# Patient Record
Sex: FEMALE | Race: BLACK OR AFRICAN AMERICAN | NOT HISPANIC OR LATINO | URBAN - METROPOLITAN AREA
[De-identification: names, ages, dates, MRNs, and addresses within clinical notes are randomized per-mention and may not be internally consistent; named-entity substitution may affect disease eponyms.]

---

## 2020-07-06 ENCOUNTER — OFFICE VISIT (OUTPATIENT)
Dept: URBAN - METROPOLITAN AREA TELEHEALTH 2 | Facility: TELEHEALTH | Age: 80
End: 2020-07-06

## 2020-07-07 ENCOUNTER — OFFICE VISIT (OUTPATIENT)
Dept: URBAN - METROPOLITAN AREA CLINIC 17 | Facility: CLINIC | Age: 80
End: 2020-07-07

## 2020-08-13 ENCOUNTER — OFFICE VISIT (OUTPATIENT)
Dept: URBAN - METROPOLITAN AREA CLINIC 17 | Facility: CLINIC | Age: 80
End: 2020-08-13
Payer: MEDICARE

## 2020-08-13 DIAGNOSIS — K21.0 GERD WITH ESOPHAGITIS: ICD-10-CM

## 2020-08-13 DIAGNOSIS — R13.10 ESOPHAGEAL DYSPHAGIA: ICD-10-CM

## 2020-08-13 PROCEDURE — 99214 OFFICE O/P EST MOD 30 MIN: CPT | Performed by: INTERNAL MEDICINE

## 2020-08-13 RX ORDER — TRAMADOL HYDROCHLORIDE 50 MG/1
1 TABLET AS NEEDED TABLET, FILM COATED ORAL ONCE A DAY
Status: ACTIVE | COMMUNITY

## 2020-08-13 RX ORDER — METOPROLOL SUCCINATE 25 MG/1
TABLET, EXTENDED RELEASE ORAL
Qty: 0 | Refills: 0 | Status: ACTIVE | COMMUNITY
Start: 2017-07-11

## 2020-08-13 RX ORDER — LEVOTHYROXINE SODIUM 0.05 MG/1
TABLET ORAL
Qty: 0 | Refills: 0 | Status: ACTIVE | COMMUNITY
Start: 2017-05-31

## 2020-08-13 RX ORDER — OMEPRAZOLE 40 MG/1
1 CAPSULE 30 MINUTES BEFORE MORNING MEAL CAPSULE, DELAYED RELEASE ORAL ONCE A DAY
Qty: 90 | Refills: 3 | OUTPATIENT
Start: 2020-08-13

## 2020-08-13 NOTE — HPI-OTHER HISTORIES
This is a follow-up appointment for this patient, a 79 year old  female after a previous visit on 07/22/2019, for an evaluation for dysphagia and heartburn. The patient has been experiencing these symptoms for 10 years. The heartburn lasts less than an hour. It is exacerbated by peanuts. It is improved by liquid Anti-acids and. The patient has used medications to relieve the heartburn. Medications used include Omeprazole 40 mg with moderate relief which she ran out of  a few weeks ago. Dysphagia is noted with solid food which initially had improved following EGD with dilation August 2019. Biopsies revealed esophagitis.

## 2020-08-17 ENCOUNTER — TELEPHONE ENCOUNTER (OUTPATIENT)
Dept: URBAN - METROPOLITAN AREA CLINIC 17 | Facility: CLINIC | Age: 80
End: 2020-08-17

## 2020-09-24 ENCOUNTER — OFFICE VISIT (OUTPATIENT)
Dept: URBAN - METROPOLITAN AREA CLINIC 17 | Facility: CLINIC | Age: 80
End: 2020-09-24

## 2020-10-19 ENCOUNTER — OFFICE VISIT (OUTPATIENT)
Dept: URBAN - METROPOLITAN AREA TELEHEALTH 2 | Facility: TELEHEALTH | Age: 80
End: 2020-10-19
Payer: MEDICARE

## 2020-10-19 DIAGNOSIS — K21.00 CHRONIC REFLUX ESOPHAGITIS: ICD-10-CM

## 2020-10-19 DIAGNOSIS — R13.19 OTHER DYSPHAGIA: ICD-10-CM

## 2020-10-19 PROCEDURE — 99214 OFFICE O/P EST MOD 30 MIN: CPT | Performed by: INTERNAL MEDICINE

## 2020-10-19 RX ORDER — METOPROLOL SUCCINATE 25 MG/1
TABLET, EXTENDED RELEASE ORAL
Qty: 0 | Refills: 0 | Status: ACTIVE | COMMUNITY
Start: 2017-07-11

## 2020-10-19 RX ORDER — OMEPRAZOLE 40 MG/1
1 CAPSULE 30 MINUTES BEFORE MORNING MEAL CAPSULE, DELAYED RELEASE ORAL ONCE A DAY
OUTPATIENT
Start: 2020-08-13

## 2020-10-19 RX ORDER — OMEPRAZOLE 40 MG/1
1 CAPSULE 30 MINUTES BEFORE MORNING MEAL CAPSULE, DELAYED RELEASE ORAL ONCE A DAY
Qty: 90 | Refills: 3 | Status: ACTIVE | COMMUNITY
Start: 2020-08-13

## 2020-10-19 RX ORDER — FAMOTIDINE 40 MG/1
1 TABLET AT BEDTIME TABLET, FILM COATED ORAL ONCE A DAY
Qty: 30 TABLET | Refills: 6 | OUTPATIENT
Start: 2020-10-19

## 2020-10-19 RX ORDER — TRAMADOL HYDROCHLORIDE 50 MG/1
1 TABLET AS NEEDED TABLET, FILM COATED ORAL ONCE A DAY
Status: ACTIVE | COMMUNITY

## 2020-10-19 RX ORDER — LEVOTHYROXINE SODIUM 0.05 MG/1
TABLET ORAL
Qty: 0 | Refills: 0 | Status: ACTIVE | COMMUNITY
Start: 2017-05-31

## 2020-10-19 NOTE — HPI-TODAY'S VISIT:
Ms. Gomez is a 80-year old female here for follow-up of a barium swallow performed August 2020 for dysphagia.  The examination revealed revealed esophageal dysmotility but was negative for an esophageal stricture, hiatal hernia, and acid reflux.  The patient admits to intermittent dysphagia.  An upper endoscopy 2019 with biopsy was unremarkable.  She takes omeprazole 40 mg a few days a week which helps her symptoms.  She stopped taking it daily due to the fear of dementia as a potential side effect.

## 2020-10-19 NOTE — PHYSICAL EXAM CONSTITUTIONAL:
in no acute distress, well developed, well nourished, ambulating without difficulty, normal communication ability , pleasant, well nourished, well developed, in no acute distress , normal communication ability

## 2021-01-28 ENCOUNTER — OFFICE VISIT (OUTPATIENT)
Dept: URBAN - METROPOLITAN AREA CLINIC 17 | Facility: CLINIC | Age: 81
End: 2021-01-28

## 2021-02-23 ENCOUNTER — OFFICE VISIT (OUTPATIENT)
Dept: URBAN - METROPOLITAN AREA CLINIC 17 | Facility: CLINIC | Age: 81
End: 2021-02-23

## 2021-02-23 RX ORDER — TRAMADOL HYDROCHLORIDE 50 MG/1
1 TABLET AS NEEDED TABLET, FILM COATED ORAL ONCE A DAY
Status: ACTIVE | COMMUNITY

## 2021-02-23 RX ORDER — LEVOTHYROXINE SODIUM 0.05 MG/1
TABLET ORAL
Qty: 0 | Refills: 0 | Status: ACTIVE | COMMUNITY
Start: 2017-05-31

## 2021-02-23 RX ORDER — METOPROLOL SUCCINATE 25 MG/1
TABLET, EXTENDED RELEASE ORAL
Qty: 0 | Refills: 0 | Status: ACTIVE | COMMUNITY
Start: 2017-07-11

## 2021-02-23 RX ORDER — FAMOTIDINE 40 MG/1
1 TABLET AT BEDTIME TABLET, FILM COATED ORAL ONCE A DAY
Qty: 30 TABLET | Refills: 6 | Status: ACTIVE | COMMUNITY
Start: 2020-10-19

## 2021-02-25 ENCOUNTER — OFFICE VISIT (OUTPATIENT)
Dept: URBAN - METROPOLITAN AREA CLINIC 17 | Facility: CLINIC | Age: 81
End: 2021-02-25

## 2021-04-08 ENCOUNTER — OFFICE VISIT (OUTPATIENT)
Dept: URBAN - METROPOLITAN AREA CLINIC 17 | Facility: CLINIC | Age: 81
End: 2021-04-08
Payer: MEDICARE

## 2021-04-08 ENCOUNTER — WEB ENCOUNTER (OUTPATIENT)
Dept: URBAN - METROPOLITAN AREA CLINIC 17 | Facility: CLINIC | Age: 81
End: 2021-04-08

## 2021-04-08 DIAGNOSIS — R63.4 WEIGHT LOSS: ICD-10-CM

## 2021-04-08 DIAGNOSIS — K21.9 GERD WITHOUT ESOPHAGITIS: ICD-10-CM

## 2021-04-08 PROCEDURE — 74177 CT ABD & PELVIS W/CONTRAST: CPT | Performed by: INTERNAL MEDICINE

## 2021-04-08 PROCEDURE — 99214 OFFICE O/P EST MOD 30 MIN: CPT | Performed by: INTERNAL MEDICINE

## 2021-04-08 RX ORDER — METOPROLOL SUCCINATE 25 MG/1
TABLET, EXTENDED RELEASE ORAL
Qty: 0 | Refills: 0 | Status: ACTIVE | COMMUNITY
Start: 2017-07-11

## 2021-04-08 RX ORDER — LEVOTHYROXINE SODIUM 0.05 MG/1
TABLET ORAL
Qty: 0 | Refills: 0 | Status: ACTIVE | COMMUNITY
Start: 2017-05-31

## 2021-04-08 RX ORDER — FAMOTIDINE 40 MG/1
1 TABLET AT BEDTIME TABLET, FILM COATED ORAL ONCE A DAY
Qty: 30 TABLET | Refills: 6 | Status: ACTIVE | COMMUNITY
Start: 2020-10-19

## 2021-04-08 RX ORDER — TRAMADOL HYDROCHLORIDE 50 MG/1
1 TABLET AS NEEDED TABLET, FILM COATED ORAL ONCE A DAY
Status: ACTIVE | COMMUNITY

## 2021-04-11 ENCOUNTER — TELEPHONE ENCOUNTER (OUTPATIENT)
Dept: URBAN - METROPOLITAN AREA CLINIC 92 | Facility: CLINIC | Age: 81
End: 2021-04-11

## 2021-04-11 LAB
A/G RATIO: 1.5
ALBUMIN: 4.1
ALKALINE PHOSPHATASE: 82
ALT (SGPT): 10
AST (SGOT): 20
BILIRUBIN, TOTAL: 0.3
BUN/CREATININE RATIO: 20
BUN: 17
CA 19-9: <2
CALCIUM: 9.1
CARBON DIOXIDE, TOTAL: 24
CHLORIDE: 104
CREATININE: 0.86
EGFR IF AFRICN AM: 74
EGFR IF NONAFRICN AM: 64
GLOBULIN, TOTAL: 2.8
GLUCOSE: 82
HEMATOCRIT: 39.5
HEMOGLOBIN: 12.8
MCH: 29.1
MCHC: 32.4
MCV: 90
NRBC: (no result)
PLATELETS: 263
POTASSIUM: 5
PROTEIN, TOTAL: 6.9
RBC: 4.4
RDW: 13.2
SODIUM: 141
WBC: 6.7

## 2021-04-20 ENCOUNTER — TELEPHONE ENCOUNTER (OUTPATIENT)
Dept: URBAN - METROPOLITAN AREA CLINIC 23 | Facility: CLINIC | Age: 81
End: 2021-04-20

## 2021-04-21 ENCOUNTER — TELEPHONE ENCOUNTER (OUTPATIENT)
Dept: URBAN - METROPOLITAN AREA CLINIC 98 | Facility: CLINIC | Age: 81
End: 2021-04-21

## 2021-05-03 ENCOUNTER — TELEPHONE ENCOUNTER (OUTPATIENT)
Dept: URBAN - METROPOLITAN AREA CLINIC 92 | Facility: CLINIC | Age: 81
End: 2021-05-03

## 2021-05-03 ENCOUNTER — OFFICE VISIT (OUTPATIENT)
Dept: URBAN - METROPOLITAN AREA TELEHEALTH 2 | Facility: TELEHEALTH | Age: 81
End: 2021-05-03
Payer: MEDICARE

## 2021-05-03 DIAGNOSIS — K59.01 CONSTIPATION BY DELAYED COLONIC TRANSIT: ICD-10-CM

## 2021-05-03 DIAGNOSIS — K11.7 XEROSTOMIA: ICD-10-CM

## 2021-05-03 DIAGNOSIS — K21.9 GERD WITHOUT ESOPHAGITIS: ICD-10-CM

## 2021-05-03 PROCEDURE — 99213 OFFICE O/P EST LOW 20 MIN: CPT | Performed by: INTERNAL MEDICINE

## 2021-05-03 RX ORDER — TRAMADOL HYDROCHLORIDE 50 MG/1
1 TABLET AS NEEDED TABLET, FILM COATED ORAL ONCE A DAY
Status: ACTIVE | COMMUNITY

## 2021-05-03 RX ORDER — METOPROLOL SUCCINATE 25 MG/1
TABLET, EXTENDED RELEASE ORAL
Qty: 0 | Refills: 0 | Status: ACTIVE | COMMUNITY
Start: 2017-07-11

## 2021-05-03 RX ORDER — LINACLOTIDE 145 UG/1
1 CAPSULE AT LEAST 30 MINUTES BEFORE THE FIRST MEAL CAPSULE, GELATIN COATED ORAL ONCE A DAY
Qty: 30 | Refills: 2 | OUTPATIENT
Start: 2021-05-03 | End: 2021-08-01

## 2021-05-03 RX ORDER — OMEPRAZOLE 40 MG/1
1 CAPSULE 30 MINUTES BEFORE MORNING MEAL CAPSULE, DELAYED RELEASE ORAL ONCE A DAY
Qty: 30 | Refills: 6 | OUTPATIENT
Start: 2021-05-03

## 2021-05-03 RX ORDER — LEVOTHYROXINE SODIUM 0.05 MG/1
TABLET ORAL
Qty: 0 | Refills: 0 | Status: ACTIVE | COMMUNITY
Start: 2017-05-31

## 2021-05-03 RX ORDER — FAMOTIDINE 40 MG/1
1 TABLET AT BEDTIME TABLET, FILM COATED ORAL ONCE A DAY
Qty: 30 TABLET | Refills: 6 | Status: ACTIVE | COMMUNITY
Start: 2020-10-19

## 2021-05-03 NOTE — HPI-TODAY'S VISIT:
October 2020 Ms. Gomez is a 80-year old female here for follow-up of a barium swallow performed August 2020 for dysphagia.  The examination revealed revealed esophageal dysmotility but was negative for an esophageal stricture, hiatal hernia, and acid reflux.  The patient admits to intermittent dysphagia.  An upper endoscopy 2019 with biopsy was unremarkable.  She takes omeprazole 40 mg a few days a week which helps her symptoms.  She stopped taking it daily due to the fear of dementia as a potential side effect.  April 8, 2021 The patient reports having a sick feeling in the stomach most mornings.  She takes Famotidine each night.  She admits to nausea but denies vomiting.  She has lost 6 pounds since October 2020.  She reports stress related to taking care of her ill  for the past 5 years.   Today:  May 3, 2021 The patient is here for follow up of abdominal discomfort.  A CT of the abd as well as CBC, CMP, and CA 19-9 were  unremarkable. She complains of early morming queasiness which dissipates after she eats breakfast.  Famotidine 40 ng at night has not provided symptomatic relief.   She admits to stress assoicated with taking care of her impaled .  Angi lawson is also complaining of dry mouth.  She has been on gabapentin for years.  The dose was increased recently to TID.  Constipation has been an issue for years. She has not responded to Miralax.

## 2021-06-28 ENCOUNTER — OFFICE VISIT (OUTPATIENT)
Dept: URBAN - METROPOLITAN AREA TELEHEALTH 2 | Facility: TELEHEALTH | Age: 81
End: 2021-06-28
Payer: MEDICARE

## 2021-06-28 DIAGNOSIS — K59.01 CONSTIPATION BY DELAYED COLONIC TRANSIT: ICD-10-CM

## 2021-06-28 PROCEDURE — 99214 OFFICE O/P EST MOD 30 MIN: CPT | Performed by: INTERNAL MEDICINE

## 2021-06-28 RX ORDER — TRAMADOL HYDROCHLORIDE 50 MG/1
1 TABLET AS NEEDED TABLET, FILM COATED ORAL ONCE A DAY
Status: ACTIVE | COMMUNITY

## 2021-06-28 RX ORDER — LINACLOTIDE 145 UG/1
1 CAPSULE AT LEAST 30 MINUTES BEFORE THE FIRST MEAL CAPSULE, GELATIN COATED ORAL ONCE A DAY
OUTPATIENT
Start: 2021-05-03 | End: 2021-08-01

## 2021-06-28 RX ORDER — OMEPRAZOLE 40 MG/1
1 CAPSULE 30 MINUTES BEFORE MORNING MEAL CAPSULE, DELAYED RELEASE ORAL ONCE A DAY
Qty: 30 | Refills: 6 | Status: ACTIVE | COMMUNITY
Start: 2021-05-03

## 2021-06-28 RX ORDER — METOPROLOL SUCCINATE 25 MG/1
TABLET, EXTENDED RELEASE ORAL
Qty: 0 | Refills: 0 | Status: ACTIVE | COMMUNITY
Start: 2017-07-11

## 2021-06-28 RX ORDER — LINACLOTIDE 290 UG/1
1 CAPSULE AT LEAST 30 MINUTES BEFORE THE FIRST MEAL OF THE DAY ON AN EMPTY STOMACH CAPSULE, GELATIN COATED ORAL ONCE A DAY
Qty: 90 | Refills: 0 | OUTPATIENT
Start: 2021-06-28 | End: 2021-09-26

## 2021-06-28 RX ORDER — LINACLOTIDE 145 UG/1
1 CAPSULE AT LEAST 30 MINUTES BEFORE THE FIRST MEAL CAPSULE, GELATIN COATED ORAL ONCE A DAY
Qty: 30 | Refills: 2 | Status: ACTIVE | COMMUNITY
Start: 2021-05-03 | End: 2021-08-01

## 2021-06-28 RX ORDER — LEVOTHYROXINE SODIUM 0.05 MG/1
TABLET ORAL
Qty: 0 | Refills: 0 | Status: ACTIVE | COMMUNITY
Start: 2017-05-31

## 2021-06-28 RX ORDER — FAMOTIDINE 40 MG/1
1 TABLET AT BEDTIME TABLET, FILM COATED ORAL ONCE A DAY
Qty: 30 TABLET | Refills: 6 | Status: ACTIVE | COMMUNITY
Start: 2020-10-19

## 2021-06-28 NOTE — PHYSICAL EXAM SKIN:
no rashes , no suspicious lesions , no areas of discoloration , no jaundice present , good turgor , no masses , no tenderness on palpation , no rashes , no suspicious lesions , no areas of discoloration

## 2021-06-28 NOTE — HPI-TODAY'S VISIT:
Ms. Gomez is here  for follow-up of constipation.  Linzess 145 mcg once daily was called in.  She had minimal improvement of stool evacuation.  Bowel movements are now twice a week.  She continues to have abdominal bloating.

## 2021-06-28 NOTE — PHYSICAL EXAM NEUROLOGIC:
oriented to person, place and time , normal sensation , short and long term memory intact , oriented to person, place and time no

## 2021-06-28 NOTE — PHYSICAL EXAM GASTROINTESTINAL
Abdomen , soft, nontender, nondistended , no guarding or rigidity , no masses palpable , normal bowel sounds , Liver and Spleen , no hepatomegaly present , no hepatosplenomegaly , liver nontender , spleen not palpable , Rectal , normal sphincter tone , no internal hemorrhoids, rectal masses or bleeding present , Self Exam: Abdomen soft, non-tender to palpatation, non-distended

## 2021-06-28 NOTE — PHYSICAL EXAM CHEST:
no lesions,  no deformities,  no traumatic injuries,  no significant scars are present,  chest wall non-tender,  no masses present, breathing is unlabored without accessory muscle use, normal breath sounds , breathing is unlabored without accessory muscle use.

## 2021-08-23 ENCOUNTER — TELEPHONE ENCOUNTER (OUTPATIENT)
Dept: URBAN - METROPOLITAN AREA TELEHEALTH 2 | Facility: TELEHEALTH | Age: 81
End: 2021-08-23

## 2021-08-23 ENCOUNTER — OFFICE VISIT (OUTPATIENT)
Dept: URBAN - METROPOLITAN AREA TELEHEALTH 2 | Facility: TELEHEALTH | Age: 81
End: 2021-08-23
Payer: MEDICARE

## 2021-08-23 DIAGNOSIS — K21.9 GERD WITHOUT ESOPHAGITIS: ICD-10-CM

## 2021-08-23 DIAGNOSIS — K59.01 CONSTIPATION BY DELAYED COLONIC TRANSIT: ICD-10-CM

## 2021-08-23 DIAGNOSIS — R13.19 ESOPHAGEAL DYSPHAGIA: ICD-10-CM

## 2021-08-23 PROCEDURE — 99213 OFFICE O/P EST LOW 20 MIN: CPT | Performed by: INTERNAL MEDICINE

## 2021-08-23 RX ORDER — OMEPRAZOLE 40 MG/1
1 CAPSULE 30 MINUTES BEFORE MORNING MEAL CAPSULE, DELAYED RELEASE ORAL ONCE A DAY
Qty: 30 | Refills: 6 | Status: ACTIVE | COMMUNITY
Start: 2021-05-03

## 2021-08-23 RX ORDER — LINACLOTIDE 290 UG/1
1 CAPSULE AT LEAST 30 MINUTES BEFORE THE FIRST MEAL OF THE DAY ON AN EMPTY STOMACH CAPSULE, GELATIN COATED ORAL ONCE A DAY
Qty: 90 | Refills: 0 | Status: ACTIVE | COMMUNITY
Start: 2021-06-28 | End: 2021-09-26

## 2021-08-23 RX ORDER — METOPROLOL SUCCINATE 25 MG/1
TABLET, EXTENDED RELEASE ORAL
Qty: 0 | Refills: 0 | Status: ACTIVE | COMMUNITY
Start: 2017-07-11

## 2021-08-23 RX ORDER — LINACLOTIDE 290 UG/1
1 CAPSULE AT LEAST 30 MINUTES BEFORE THE FIRST MEAL OF THE DAY ON AN EMPTY STOMACH CAPSULE, GELATIN COATED ORAL ONCE A DAY
OUTPATIENT
Start: 2021-06-28 | End: 2021-09-26

## 2021-08-23 RX ORDER — FAMOTIDINE 40 MG/1
1 TABLET AT BEDTIME TABLET, FILM COATED ORAL ONCE A DAY
Qty: 30 TABLET | Refills: 6 | Status: ACTIVE | COMMUNITY
Start: 2020-10-19

## 2021-08-23 RX ORDER — LINACLOTIDE 145 UG/1
1 CAPSULE AT LEAST 30 MINUTES BEFORE THE FIRST MEAL CAPSULE, GELATIN COATED ORAL ONCE A DAY
Qty: 30 | Refills: 2 | COMMUNITY
Start: 2021-05-03 | End: 2021-08-23

## 2021-08-23 RX ORDER — LEVOTHYROXINE SODIUM 0.05 MG/1
TABLET ORAL
Qty: 0 | Refills: 0 | Status: ACTIVE | COMMUNITY
Start: 2017-05-31

## 2021-08-23 RX ORDER — LINACLOTIDE 145 UG/1
1 CAPSULE AT LEAST 30 MINUTES BEFORE THE FIRST MEAL CAPSULE, GELATIN COATED ORAL ONCE A DAY
Qty: 30 | Refills: 2 | OUTPATIENT
Start: 2021-05-03 | End: 2021-08-23

## 2021-08-23 RX ORDER — FAMOTIDINE 40 MG/1
1 TABLET AT BEDTIME TABLET, FILM COATED ORAL ONCE A DAY
Qty: 30 TABLET | Refills: 6
Start: 2020-10-19

## 2021-08-23 RX ORDER — TRAMADOL HYDROCHLORIDE 50 MG/1
1 TABLET AS NEEDED TABLET, FILM COATED ORAL ONCE A DAY
Status: ACTIVE | COMMUNITY

## 2021-08-23 NOTE — HPI-TODAY'S VISIT:
(June 2021) Ms. Gomez is here  for follow-up of constipation.  Linzess 145 mcg once daily was called in.  She had minimal improvement of stool evacuation.  Bowel movements are now twice a week.  She continues to have abdominal bloating.   Today: August 23, 2021 The patient stopped the Linzess 290 due to abdominal discomfort.  Prune juice twice a day results in bowel movements every 2 - 3 days.  She does not have the sensation of complete emptying.   She also reports dysphagia to pills and sometimes solid food.  She denies early satiety and weight loss.  Omeprazole 40 mg is taken each morning.  She needs a refill on Famotidine taken at night.

## 2021-09-20 ENCOUNTER — OFFICE VISIT (OUTPATIENT)
Dept: URBAN - METROPOLITAN AREA TELEHEALTH 2 | Facility: TELEHEALTH | Age: 81
End: 2021-09-20
Payer: MEDICARE

## 2021-09-20 DIAGNOSIS — K59.09 CHANGE IN BOWEL MOVEMENTS INTERMITTENT CONSTIPATION. URGENCY IN THE MORNING.: ICD-10-CM

## 2021-09-20 DIAGNOSIS — K21.9 GERD WITHOUT ESOPHAGITIS: ICD-10-CM

## 2021-09-20 PROCEDURE — 99213 OFFICE O/P EST LOW 20 MIN: CPT | Performed by: INTERNAL MEDICINE

## 2021-09-20 RX ORDER — OMEPRAZOLE 40 MG/1
1 CAPSULE 30 MINUTES BEFORE MORNING MEAL CAPSULE, DELAYED RELEASE ORAL ONCE A DAY
Qty: 30 | Refills: 6 | Status: ACTIVE | COMMUNITY
Start: 2021-05-03

## 2021-09-20 RX ORDER — METOPROLOL SUCCINATE 25 MG/1
TABLET, EXTENDED RELEASE ORAL
Qty: 0 | Refills: 0 | Status: ACTIVE | COMMUNITY
Start: 2017-07-11

## 2021-09-20 RX ORDER — LEVOTHYROXINE SODIUM 0.05 MG/1
TABLET ORAL
Qty: 0 | Refills: 0 | Status: ACTIVE | COMMUNITY
Start: 2017-05-31

## 2021-09-20 RX ORDER — TRAMADOL HYDROCHLORIDE 50 MG/1
1 TABLET AS NEEDED TABLET, FILM COATED ORAL ONCE A DAY
Status: ACTIVE | COMMUNITY

## 2021-09-20 RX ORDER — FAMOTIDINE 40 MG/1
1 TABLET AT BEDTIME TABLET, FILM COATED ORAL ONCE A DAY
Qty: 30 TABLET | Refills: 6 | Status: ACTIVE | COMMUNITY
Start: 2020-10-19

## 2021-09-20 RX ORDER — OMEPRAZOLE 40 MG/1
1 CAPSULE 30 MINUTES BEFORE MORNING MEAL CAPSULE, DELAYED RELEASE ORAL ONCE A DAY
OUTPATIENT
Start: 2021-05-03

## 2021-09-20 NOTE — HPI-TODAY'S VISIT:
(June 2021) Ms. Gomez is here  for follow-up of constipation.  Linzess 145 mcg once daily was called in.  She had minimal improvement of stool evacuation.  Bowel movements are now twice a week.  She continues to have abdominal bloating.  (August 23, 2021) The patient stopped the Linzess 290 due to abdominal discomfort.  Prune juice twice a day results in bowel movements every 2 - 3 days.  She does not have the sensation of complete emptying.   She also reports dysphagia to pills and sometimes solid food.  She denies early satiety and weight loss.  Omeprazole 40 mg is taken each morning.  She needs a refill on Famotidine taken at night.  Today: September 20, 2021 The patient is here for follow up of a barium swallow performed for esophageal dysphagia.  The exam 9/1/2021 demonstrated mild esophageal dysmotility with minimal delayed clearance of barium from the distal esophagus.  There was no evidence of an esophageal stricture or GERD.  The patient is currently taking Omeprazole 40 mg once a day and famotidine 40 mg at night.  Constipation has improved with Miralax/prune juice combo daily.   A CT scan April 2021 was negative for an acute abnormality.

## 2022-01-21 ENCOUNTER — OFFICE VISIT (OUTPATIENT)
Dept: URBAN - METROPOLITAN AREA CLINIC 92 | Facility: CLINIC | Age: 82
End: 2022-01-21
Payer: MEDICARE

## 2022-01-21 ENCOUNTER — WEB ENCOUNTER (OUTPATIENT)
Dept: URBAN - METROPOLITAN AREA CLINIC 92 | Facility: CLINIC | Age: 82
End: 2022-01-21

## 2022-01-21 VITALS
BODY MASS INDEX: 28.7 KG/M2 | DIASTOLIC BLOOD PRESSURE: 88 MMHG | SYSTOLIC BLOOD PRESSURE: 145 MMHG | HEART RATE: 82 BPM | WEIGHT: 162 LBS | HEIGHT: 63 IN

## 2022-01-21 DIAGNOSIS — K21.9 GASTROESOPHAGEAL REFLUX DISEASE, UNSPECIFIED WHETHER ESOPHAGITIS PRESENT: ICD-10-CM

## 2022-01-21 PROBLEM — 235595009: Status: ACTIVE | Noted: 2022-01-21

## 2022-01-21 PROCEDURE — 99214 OFFICE O/P EST MOD 30 MIN: CPT | Performed by: INTERNAL MEDICINE

## 2022-01-21 RX ORDER — METOPROLOL SUCCINATE 25 MG/1
TABLET, EXTENDED RELEASE ORAL
Qty: 0 | Refills: 0 | Status: ACTIVE | COMMUNITY
Start: 2017-07-11

## 2022-01-21 RX ORDER — FAMOTIDINE 40 MG/1
1 TABLET AT BEDTIME TABLET, FILM COATED ORAL ONCE A DAY
Qty: 30 TABLET | Refills: 6 | Status: ON HOLD | COMMUNITY
Start: 2020-10-19

## 2022-01-21 RX ORDER — LEVOTHYROXINE SODIUM 0.05 MG/1
TABLET ORAL
Qty: 0 | Refills: 0 | Status: ACTIVE | COMMUNITY
Start: 2017-05-31

## 2022-01-21 RX ORDER — TRAMADOL HYDROCHLORIDE 50 MG/1
1 TABLET AS NEEDED TABLET, FILM COATED ORAL ONCE A DAY
Status: ACTIVE | COMMUNITY

## 2022-01-21 RX ORDER — OMEPRAZOLE 40 MG/1
1 CAPSULE 30 MINUTES BEFORE MORNING MEAL CAPSULE, DELAYED RELEASE ORAL ONCE A DAY
Qty: 90 | Refills: 3
Start: 2021-05-03

## 2022-01-21 NOTE — HPI-TODAY'S VISIT:
81-year-old female hypothyroidism, GERD, who presents to discuss nausea and GERD.  For the past month, she has felt nauseated in the morning and notes the sensation of acid in her throat.  Feels like she can cough it up in the morning and also notes a sore throat at that time.  Occasionally notes the sensation of food sitting in her esophagus, localizes to the SSN.  Last EGD 8/2019 for dysphagia, performed empiric dilation to 50 Sami.  She has been on omeprazole in the past but believes that she stopped it about a month ago.  Notes that when she was taking it, it seemed to control the symptoms above.  She was worried that it caused dementia and so she stopped it. Barium swallow from September 2021 reviewed, notable for mild dysmotility.

## 2022-02-18 ENCOUNTER — TELEPHONE ENCOUNTER (OUTPATIENT)
Dept: URBAN - METROPOLITAN AREA CLINIC 92 | Facility: CLINIC | Age: 82
End: 2022-02-18

## 2022-02-18 ENCOUNTER — OFFICE VISIT (OUTPATIENT)
Dept: URBAN - METROPOLITAN AREA CLINIC 92 | Facility: CLINIC | Age: 82
End: 2022-02-18
Payer: MEDICARE

## 2022-02-18 VITALS
WEIGHT: 160 LBS | BODY MASS INDEX: 28.35 KG/M2 | HEIGHT: 63 IN | SYSTOLIC BLOOD PRESSURE: 129 MMHG | DIASTOLIC BLOOD PRESSURE: 73 MMHG | TEMPERATURE: 96.8 F | HEART RATE: 74 BPM

## 2022-02-18 DIAGNOSIS — K21.9 GERD: ICD-10-CM

## 2022-02-18 PROCEDURE — 99214 OFFICE O/P EST MOD 30 MIN: CPT | Performed by: INTERNAL MEDICINE

## 2022-02-18 RX ORDER — TRAMADOL HYDROCHLORIDE 50 MG/1
1 TABLET AS NEEDED TABLET, FILM COATED ORAL ONCE A DAY
Status: ACTIVE | COMMUNITY

## 2022-02-18 RX ORDER — METOPROLOL SUCCINATE 25 MG/1
TABLET, EXTENDED RELEASE ORAL
Qty: 0 | Refills: 0 | Status: ACTIVE | COMMUNITY
Start: 2017-07-11

## 2022-02-18 RX ORDER — FAMOTIDINE 40 MG/1
1 TABLET AT BEDTIME TABLET, FILM COATED ORAL ONCE A DAY
Qty: 30 TABLET | Refills: 6 | Status: ON HOLD | COMMUNITY
Start: 2020-10-19

## 2022-02-18 RX ORDER — OMEPRAZOLE 40 MG/1
1 CAPSULE 30 MINUTES BEFORE MORNING AND EVENING MEAL CAPSULE, DELAYED RELEASE ORAL TWICE A DAY
Qty: 180 | Refills: 1
Start: 2021-05-03

## 2022-02-18 RX ORDER — OMEPRAZOLE 40 MG/1
1 CAPSULE 30 MINUTES BEFORE MORNING MEAL CAPSULE, DELAYED RELEASE ORAL ONCE A DAY
Qty: 90 | Refills: 3 | Status: ACTIVE | COMMUNITY
Start: 2021-05-03

## 2022-02-18 RX ORDER — LEVOTHYROXINE SODIUM 0.05 MG/1
TABLET ORAL
Qty: 0 | Refills: 0 | Status: ACTIVE | COMMUNITY
Start: 2017-05-31

## 2022-02-18 NOTE — EXAM-FUNCTIONAL ASSESSMENT
CONSTITUTIONAL - well-developed, well-nourished, NAD HEENT - sclerae and conjuntivae appear normal, external nose normal appearance, no nasal discharge NECK - normal appearance, no deformities CHEST - no increased work of breathing, no accessory muscle use CV - no edema, irregular rate GI - soft, NTND, no guarding or rigidity, no palpable masses or HSM MSK - no deformities, normal gait SKIN - good turgor, no obvious rashes NEURO - AAOx3 PSYCH - normal mood and appropriate affect

## 2022-02-18 NOTE — HPI-TODAY'S VISIT:
81-year-old female hypothyroidism, GERD, who presents to discuss nausea and GERD.  For the past month, she has felt nauseated in the morning and notes the sensation of acid in her throat.  Feels like she can cough it up in the morning and also notes a sore throat at that time.  Occasionally notes the sensation of food sitting in her esophagus, localizes to the SSN.  Last EGD 8/2019 for dysphagia, performed empiric dilation to 50 Divehi.  She has been on omeprazole in the past but believes that she stopped it about a month ago.  Notes that when she was taking it, it seemed to control the symptoms above.  She was worried that it caused dementia and so she stopped it. Barium swallow from September 2021 reviewed, notable for mild dysmotility.  ***2/18/22: Since I last saw her, she started omeprazole 40 mg daily, which provided some relief.  However she is still having regurgitation and dysphagia, localizing to the neck and substernal notch.  Tastes bitter and sour when it comes back up.  She recently was found to have an irregular heartbeat, and is set to undergo a stress test next week and then follow-up again with cardiology.

## 2022-03-03 ENCOUNTER — LAB OUTSIDE AN ENCOUNTER (OUTPATIENT)
Dept: URBAN - METROPOLITAN AREA CLINIC 92 | Facility: CLINIC | Age: 82
End: 2022-03-03

## 2022-03-03 ENCOUNTER — TELEPHONE ENCOUNTER (OUTPATIENT)
Dept: URBAN - METROPOLITAN AREA CLINIC 92 | Facility: CLINIC | Age: 82
End: 2022-03-03

## 2022-03-03 RX ORDER — METOPROLOL SUCCINATE 25 MG/1
TABLET, EXTENDED RELEASE ORAL
Qty: 0 | Refills: 0 | Status: ACTIVE | COMMUNITY
Start: 2017-07-11

## 2022-03-03 RX ORDER — OMEPRAZOLE 40 MG/1
1 CAPSULE 30 MINUTES BEFORE MORNING AND EVENING MEAL CAPSULE, DELAYED RELEASE ORAL TWICE A DAY
Qty: 180 | Refills: 1 | Status: ACTIVE | COMMUNITY
Start: 2021-05-03

## 2022-03-03 RX ORDER — TRAMADOL HYDROCHLORIDE 50 MG/1
1 TABLET AS NEEDED TABLET, FILM COATED ORAL ONCE A DAY
Status: ACTIVE | COMMUNITY

## 2022-03-03 RX ORDER — FAMOTIDINE 40 MG/1
1 TABLET AT BEDTIME TABLET, FILM COATED ORAL ONCE A DAY
Qty: 30 TABLET | Refills: 6 | Status: ON HOLD | COMMUNITY
Start: 2020-10-19

## 2022-03-03 RX ORDER — POLYETHYLENE GLYCOL 3350, SODIUM SULFATE ANHYDROUS, SODIUM BICARBONATE, SODIUM CHLORIDE, POTASSIUM CHLORIDE 236; 22.74; 6.74; 5.86; 2.97 G/4L; G/4L; G/4L; G/4L; G/4L
AS DIRECTED POWDER, FOR SOLUTION ORAL
Qty: 1 BOTTLE | OUTPATIENT
Start: 2022-03-03

## 2022-03-03 RX ORDER — LEVOTHYROXINE SODIUM 0.05 MG/1
TABLET ORAL
Qty: 0 | Refills: 0 | Status: ACTIVE | COMMUNITY
Start: 2017-05-31

## 2022-03-18 ENCOUNTER — TELEPHONE ENCOUNTER (OUTPATIENT)
Dept: URBAN - METROPOLITAN AREA CLINIC 92 | Facility: CLINIC | Age: 82
End: 2022-03-18

## 2022-03-18 PROBLEM — 235595009 GASTROESOPHAGEAL REFLUX DISEASE: Status: ACTIVE | Noted: 2021-09-20

## 2022-03-31 ENCOUNTER — OFFICE VISIT (OUTPATIENT)
Dept: URBAN - METROPOLITAN AREA SURGERY CENTER 16 | Facility: SURGERY CENTER | Age: 82
End: 2022-03-31
Payer: MEDICARE

## 2022-03-31 ENCOUNTER — CLAIMS CREATED FROM THE CLAIM WINDOW (OUTPATIENT)
Dept: URBAN - METROPOLITAN AREA CLINIC 4 | Facility: CLINIC | Age: 82
End: 2022-03-31
Payer: MEDICARE

## 2022-03-31 DIAGNOSIS — K21.9 GASTRO-ESOPHAGEAL REFLUX DISEASE WITHOUT ESOPHAGITIS: ICD-10-CM

## 2022-03-31 DIAGNOSIS — K21.9 ACID REFLUX: ICD-10-CM

## 2022-03-31 DIAGNOSIS — K64.8 BLEEDING INTERNAL HEMORRHOIDS: ICD-10-CM

## 2022-03-31 DIAGNOSIS — R13.19 CERVICAL DYSPHAGIA: ICD-10-CM

## 2022-03-31 DIAGNOSIS — K31.89 FOCAL FOVEOLAR HYPERPLASIA: ICD-10-CM

## 2022-03-31 DIAGNOSIS — K31.89 ACQUIRED DEFORMITY OF DUODENUM: ICD-10-CM

## 2022-03-31 PROCEDURE — 88312 SPECIAL STAINS GROUP 1: CPT | Performed by: PATHOLOGY

## 2022-03-31 PROCEDURE — 88305 TISSUE EXAM BY PATHOLOGIST: CPT | Performed by: PATHOLOGY

## 2022-03-31 PROCEDURE — 88342 IMHCHEM/IMCYTCHM 1ST ANTB: CPT | Performed by: PATHOLOGY

## 2022-03-31 PROCEDURE — G8907 PT DOC NO EVENTS ON DISCHARG: HCPCS | Performed by: INTERNAL MEDICINE

## 2022-03-31 PROCEDURE — 43239 EGD BIOPSY SINGLE/MULTIPLE: CPT | Performed by: INTERNAL MEDICINE

## 2022-03-31 PROCEDURE — 43249 ESOPH EGD DILATION <30 MM: CPT | Performed by: INTERNAL MEDICINE

## 2022-03-31 PROCEDURE — 45378 DIAGNOSTIC COLONOSCOPY: CPT | Performed by: INTERNAL MEDICINE

## 2022-03-31 RX ORDER — OMEPRAZOLE 40 MG/1
1 CAPSULE 30 MINUTES BEFORE MORNING AND EVENING MEAL CAPSULE, DELAYED RELEASE ORAL TWICE A DAY
Qty: 180 | Refills: 1 | Status: ACTIVE | COMMUNITY
Start: 2021-05-03

## 2022-03-31 RX ORDER — FAMOTIDINE 40 MG/1
1 TABLET AT BEDTIME TABLET, FILM COATED ORAL ONCE A DAY
Qty: 30 TABLET | Refills: 6 | Status: ON HOLD | COMMUNITY
Start: 2020-10-19

## 2022-03-31 RX ORDER — METOPROLOL SUCCINATE 25 MG/1
TABLET, EXTENDED RELEASE ORAL
Qty: 0 | Refills: 0 | Status: ACTIVE | COMMUNITY
Start: 2017-07-11

## 2022-03-31 RX ORDER — POLYETHYLENE GLYCOL 3350, SODIUM SULFATE ANHYDROUS, SODIUM BICARBONATE, SODIUM CHLORIDE, POTASSIUM CHLORIDE 236; 22.74; 6.74; 5.86; 2.97 G/4L; G/4L; G/4L; G/4L; G/4L
AS DIRECTED POWDER, FOR SOLUTION ORAL
Qty: 1 BOTTLE | Status: ACTIVE | COMMUNITY
Start: 2022-03-03

## 2022-03-31 RX ORDER — LEVOTHYROXINE SODIUM 0.05 MG/1
TABLET ORAL
Qty: 0 | Refills: 0 | Status: ACTIVE | COMMUNITY
Start: 2017-05-31

## 2022-03-31 RX ORDER — TRAMADOL HYDROCHLORIDE 50 MG/1
1 TABLET AS NEEDED TABLET, FILM COATED ORAL ONCE A DAY
Status: ACTIVE | COMMUNITY

## 2022-04-01 ENCOUNTER — TELEPHONE ENCOUNTER (OUTPATIENT)
Dept: URBAN - METROPOLITAN AREA CLINIC 92 | Facility: CLINIC | Age: 82
End: 2022-04-01

## 2022-05-04 ENCOUNTER — OFFICE VISIT (OUTPATIENT)
Dept: URBAN - METROPOLITAN AREA CLINIC 92 | Facility: CLINIC | Age: 82
End: 2022-05-04
Payer: MEDICARE

## 2022-05-04 ENCOUNTER — TELEPHONE ENCOUNTER (OUTPATIENT)
Dept: URBAN - METROPOLITAN AREA CLINIC 92 | Facility: CLINIC | Age: 82
End: 2022-05-04

## 2022-05-04 VITALS
DIASTOLIC BLOOD PRESSURE: 75 MMHG | HEIGHT: 63 IN | HEART RATE: 61 BPM | BODY MASS INDEX: 27.46 KG/M2 | WEIGHT: 155 LBS | TEMPERATURE: 98.7 F | SYSTOLIC BLOOD PRESSURE: 132 MMHG

## 2022-05-04 DIAGNOSIS — K21.9 GERD: ICD-10-CM

## 2022-05-04 PROCEDURE — 99214 OFFICE O/P EST MOD 30 MIN: CPT | Performed by: INTERNAL MEDICINE

## 2022-05-04 RX ORDER — POLYETHYLENE GLYCOL 3350, SODIUM SULFATE ANHYDROUS, SODIUM BICARBONATE, SODIUM CHLORIDE, POTASSIUM CHLORIDE 236; 22.74; 6.74; 5.86; 2.97 G/4L; G/4L; G/4L; G/4L; G/4L
AS DIRECTED POWDER, FOR SOLUTION ORAL
Qty: 1 BOTTLE | Status: ON HOLD | COMMUNITY
Start: 2022-03-03

## 2022-05-04 RX ORDER — METOPROLOL SUCCINATE 25 MG/1
TABLET, EXTENDED RELEASE ORAL
Qty: 0 | Refills: 0 | Status: ACTIVE | COMMUNITY
Start: 2017-07-11

## 2022-05-04 RX ORDER — TRAMADOL HYDROCHLORIDE 50 MG/1
1 TABLET AS NEEDED TABLET, FILM COATED ORAL ONCE A DAY
Status: ACTIVE | COMMUNITY

## 2022-05-04 RX ORDER — LEVOTHYROXINE SODIUM 0.05 MG/1
TABLET ORAL
Qty: 0 | Refills: 0 | Status: ACTIVE | COMMUNITY
Start: 2017-05-31

## 2022-05-04 RX ORDER — OMEPRAZOLE 40 MG/1
1 CAPSULE 30 MINUTES BEFORE MORNING AND EVENING MEAL CAPSULE, DELAYED RELEASE ORAL TWICE A DAY
Qty: 180 | Refills: 1 | Status: ACTIVE | COMMUNITY
Start: 2021-05-03

## 2022-05-04 RX ORDER — FAMOTIDINE 40 MG/1
1 TABLET AT BEDTIME TABLET, FILM COATED ORAL ONCE A DAY
Qty: 30 TABLET | Refills: 6 | Status: ON HOLD | COMMUNITY
Start: 2020-10-19

## 2022-05-04 RX ORDER — OMEPRAZOLE 40 MG/1
1 CAPSULE 30 MINUTES BEFORE MORNING AND EVENING MEAL CAPSULE, DELAYED RELEASE ORAL TWICE A DAY
Qty: 180 | Refills: 1

## 2022-05-04 NOTE — HPI-TODAY'S VISIT:
81-year-old female hypothyroidism, GERD, who presents to discuss nausea and GERD.  For the past month, she has felt nauseated in the morning and notes the sensation of acid in her throat.  Feels like she can cough it up in the morning and also notes a sore throat at that time.  Occasionally notes the sensation of food sitting in her esophagus, localizes to the SSN.  Last EGD 8/2019 for dysphagia, performed empiric dilation to 50 Bulgarian.  She has been on omeprazole in the past but believes that she stopped it about a month ago.  Notes that when she was taking it, it seemed to control the symptoms above.  She was worried that it caused dementia and so she stopped it. Barium swallow from September 2021 reviewed, notable for mild dysmotility.   ***2/18/22: Since I last saw her, she started omeprazole 40 mg daily, which provided some relief.  However she is still having regurgitation and dysphagia, localizing to the neck and substernal notch.  Tastes bitter and sour when it comes back up.  She recently was found to have an irregular heartbeat, and is set to undergo a stress test next week and then follow-up again with cardiology.   ***5/4/22: EGD and colonoscopy unremarkable, esophageal and gastric biopsies unremarkable.  Empiric dilation performed.  Patient is doing much better, thinks that the dilation helped as well as the omeprazole 40 mg twice daily.  Dysphagia improved significantly.  Weight is stable now.  Still has not much of an appetite but her weight is not going down.  Has episodes of diaphoresis and nausea.  Noted left rib pain at times.

## 2022-06-27 ENCOUNTER — TELEPHONE ENCOUNTER (OUTPATIENT)
Dept: URBAN - METROPOLITAN AREA CLINIC 92 | Facility: CLINIC | Age: 82
End: 2022-06-27

## 2022-07-10 ENCOUNTER — WEB ENCOUNTER (OUTPATIENT)
Dept: URBAN - METROPOLITAN AREA TELEHEALTH 2 | Facility: TELEHEALTH | Age: 82
End: 2022-07-10

## 2022-07-12 ENCOUNTER — OFFICE VISIT (OUTPATIENT)
Dept: URBAN - METROPOLITAN AREA TELEHEALTH 2 | Facility: TELEHEALTH | Age: 82
End: 2022-07-12
Payer: MEDICARE

## 2022-07-12 ENCOUNTER — TELEPHONE ENCOUNTER (OUTPATIENT)
Dept: URBAN - METROPOLITAN AREA CLINIC 92 | Facility: CLINIC | Age: 82
End: 2022-07-12

## 2022-07-12 DIAGNOSIS — K21.9 GERD: ICD-10-CM

## 2022-07-12 PROCEDURE — 99443 PHONE E/M BY PHYS 21-30 MIN: CPT | Performed by: INTERNAL MEDICINE

## 2022-07-12 RX ORDER — OMEPRAZOLE 40 MG/1
1 CAPSULE 30 MINUTES BEFORE MORNING AND EVENING MEAL CAPSULE, DELAYED RELEASE ORAL TWICE A DAY
Qty: 180 | Refills: 1 | Status: ACTIVE | COMMUNITY

## 2022-07-12 RX ORDER — POLYETHYLENE GLYCOL 3350, SODIUM SULFATE ANHYDROUS, SODIUM BICARBONATE, SODIUM CHLORIDE, POTASSIUM CHLORIDE 236; 22.74; 6.74; 5.86; 2.97 G/4L; G/4L; G/4L; G/4L; G/4L
AS DIRECTED POWDER, FOR SOLUTION ORAL
Qty: 1 BOTTLE | Status: ON HOLD | COMMUNITY
Start: 2022-03-03

## 2022-07-12 RX ORDER — TRAMADOL HYDROCHLORIDE 50 MG/1
1 TABLET AS NEEDED TABLET, FILM COATED ORAL ONCE A DAY
Status: ACTIVE | COMMUNITY

## 2022-07-12 RX ORDER — FAMOTIDINE 40 MG/1
1 TABLET AT BEDTIME TABLET, FILM COATED ORAL ONCE A DAY
Qty: 30 TABLET | Refills: 6 | Status: ON HOLD | COMMUNITY
Start: 2020-10-19

## 2022-07-12 RX ORDER — LEVOTHYROXINE SODIUM 0.05 MG/1
TABLET ORAL
Qty: 0 | Refills: 0 | Status: ACTIVE | COMMUNITY
Start: 2017-05-31

## 2022-07-12 RX ORDER — METOPROLOL SUCCINATE 25 MG/1
TABLET, EXTENDED RELEASE ORAL
Qty: 0 | Refills: 0 | Status: ACTIVE | COMMUNITY
Start: 2017-07-11

## 2022-07-12 RX ORDER — OMEPRAZOLE 40 MG/1
1 CAPSULE 30 MINUTES BEFORE MORNING AND EVENING MEAL CAPSULE, DELAYED RELEASE ORAL TWICE A DAY
Qty: 180 | Refills: 1

## 2022-07-12 NOTE — HPI-TODAY'S VISIT:
81-year-old female hypothyroidism, GERD, who presents to discuss nausea and GERD.  For the past month, she has felt nauseated in the morning and notes the sensation of acid in her throat.  Feels like she can cough it up in the morning and also notes a sore throat at that time.  Occasionally notes the sensation of food sitting in her esophagus, localizes to the SSN.  Last EGD 8/2019 for dysphagia, performed empiric dilation to 50 Bengali.  She has been on omeprazole in the past but believes that she stopped it about a month ago.  Notes that when she was taking it, it seemed to control the symptoms above.  She was worried that it caused dementia and so she stopped it. Barium swallow from September 2021 reviewed, notable for mild dysmotility.   ***2/18/22: Since I last saw her, she started omeprazole 40 mg daily, which provided some relief.  However she is still having regurgitation and dysphagia, localizing to the neck and substernal notch.  Tastes bitter and sour when it comes back up.  She recently was found to have an irregular heartbeat, and is set to undergo a stress test next week and then follow-up again with cardiology.   ***5/4/22: EGD and colonoscopy unremarkable, esophageal and gastric biopsies unremarkable.  Empiric dilation performed.  Patient is doing much better, thinks that the dilation helped as well as the omeprazole 40 mg twice daily.  Dysphagia improved significantly.  Weight is stable now.  Still has not much of an appetite but her weight is not going down.  Has episodes of diaphoresis and nausea.  Noted left rib pain at times.  ***7/12/22: a few days ago had an episode of vomiting. having epigastric pain for the past one month, mostly in the AM. also feels constipated, sounds chronic. miralax helps when she remembers to take it. can go a week without a bm.

## 2022-08-10 ENCOUNTER — OFFICE VISIT (OUTPATIENT)
Dept: URBAN - METROPOLITAN AREA CLINIC 92 | Facility: CLINIC | Age: 82
End: 2022-08-10

## 2022-10-31 NOTE — PHYSICAL EXAM MUSCULOSKELETAL:
normal gait and station , no tenderness or deformities present Patient has no objection to blood transfusions.

## 2023-02-17 ENCOUNTER — DASHBOARD ENCOUNTERS (OUTPATIENT)
Age: 83
End: 2023-02-17

## 2023-02-20 ENCOUNTER — OFFICE VISIT (OUTPATIENT)
Dept: URBAN - METROPOLITAN AREA CLINIC 92 | Facility: CLINIC | Age: 83
End: 2023-02-20
Payer: MEDICARE

## 2023-02-20 VITALS
BODY MASS INDEX: 28.17 KG/M2 | WEIGHT: 159 LBS | HEIGHT: 63 IN | TEMPERATURE: 97.2 F | DIASTOLIC BLOOD PRESSURE: 64 MMHG | HEART RATE: 63 BPM | SYSTOLIC BLOOD PRESSURE: 119 MMHG

## 2023-02-20 DIAGNOSIS — K59.01 CONSTIPATION BY DELAYED COLONIC TRANSIT: ICD-10-CM

## 2023-02-20 DIAGNOSIS — K21.9 GERD: ICD-10-CM

## 2023-02-20 DIAGNOSIS — R13.10 DYSPHAGIA: ICD-10-CM

## 2023-02-20 DIAGNOSIS — R14.0 BLOATING: ICD-10-CM

## 2023-02-20 PROBLEM — 35298007: Status: ACTIVE | Noted: 2021-05-03

## 2023-02-20 PROBLEM — 40739000 DYSPHAGIA: Status: ACTIVE | Noted: 2020-10-19

## 2023-02-20 PROCEDURE — 99214 OFFICE O/P EST MOD 30 MIN: CPT | Performed by: INTERNAL MEDICINE

## 2023-02-20 RX ORDER — LEVOTHYROXINE SODIUM 0.05 MG/1
TABLET ORAL
Qty: 0 | Refills: 0 | Status: ACTIVE | COMMUNITY
Start: 2017-05-31

## 2023-02-20 RX ORDER — OMEPRAZOLE 40 MG/1
1 CAPSULE 30 MINUTES BEFORE MORNING AND EVENING MEAL CAPSULE, DELAYED RELEASE ORAL TWICE A DAY
Qty: 180 | Refills: 1 | Status: ACTIVE | COMMUNITY

## 2023-02-20 RX ORDER — POLYETHYLENE GLYCOL 3350, SODIUM SULFATE ANHYDROUS, SODIUM BICARBONATE, SODIUM CHLORIDE, POTASSIUM CHLORIDE 236; 22.74; 6.74; 5.86; 2.97 G/4L; G/4L; G/4L; G/4L; G/4L
AS DIRECTED POWDER, FOR SOLUTION ORAL
Qty: 1 BOTTLE | Status: ON HOLD | COMMUNITY
Start: 2022-03-03

## 2023-02-20 RX ORDER — FAMOTIDINE 40 MG/1
1 TABLET AT BEDTIME TABLET, FILM COATED ORAL ONCE A DAY
Qty: 30 TABLET | Refills: 6 | Status: ON HOLD | COMMUNITY
Start: 2020-10-19

## 2023-02-20 RX ORDER — TRAMADOL HYDROCHLORIDE 50 MG/1
1 TABLET AS NEEDED TABLET, FILM COATED ORAL ONCE A DAY
Status: ACTIVE | COMMUNITY

## 2023-02-20 RX ORDER — METOPROLOL SUCCINATE 25 MG/1
TABLET, EXTENDED RELEASE ORAL
Qty: 0 | Refills: 0 | Status: ACTIVE | COMMUNITY
Start: 2017-07-11

## 2023-02-20 NOTE — HPI-TODAY'S VISIT:
81-year-old female hypothyroidism, GERD, who presents to discuss nausea and GERD.  For the past month, she has felt nauseated in the morning and notes the sensation of acid in her throat.  Feels like she can cough it up in the morning and also notes a sore throat at that time.  Occasionally notes the sensation of food sitting in her esophagus, localizes to the SSN.  Last EGD 8/2019 for dysphagia, performed empiric dilation to 50 Yi.  She has been on omeprazole in the past but believes that she stopped it about a month ago.  Notes that when she was taking it, it seemed to control the symptoms above.  She was worried that it caused dementia and so she stopped it. Barium swallow from September 2021 reviewed, notable for mild dysmotility.   ***2/18/22: Since I last saw her, she started omeprazole 40 mg daily, which provided some relief.  However she is still having regurgitation and dysphagia, localizing to the neck and substernal notch.  Tastes bitter and sour when it comes back up.  She recently was found to have an irregular heartbeat, and is set to undergo a stress test next week and then follow-up again with cardiology.   ***5/4/22: EGD and colonoscopy unremarkable, esophageal and gastric biopsies unremarkable.  Empiric dilation performed.  Patient is doing much better, thinks that the dilation helped as well as the omeprazole 40 mg twice daily.  Dysphagia improved significantly.  Weight is stable now.  Still has not much of an appetite but her weight is not going down.  Has episodes of diaphoresis and nausea.  Noted left rib pain at times.  ***7/12/22: a few days ago had an episode of vomiting. having epigastric pain for the past one month, mostly in the AM. also feels constipated, sounds chronic. miralax helps when she remembers to take it. can go a week without a bm.  ***2/20/23: Having some constipation with daily Miralax use. Had some epigastric pain, nausea, but that has subsided. Feels gassy and bloated at times. On PPI daily.

## 2023-08-14 ENCOUNTER — TELEPHONE ENCOUNTER (OUTPATIENT)
Dept: URBAN - METROPOLITAN AREA CLINIC 92 | Facility: CLINIC | Age: 83
End: 2023-08-14

## 2023-08-14 RX ORDER — OMEPRAZOLE 40 MG/1
1 CAPSULE 30 MINUTES BEFORE MORNING AND EVENING MEAL CAPSULE, DELAYED RELEASE ORAL TWICE A DAY
Qty: 180 | Refills: 1

## 2024-04-03 NOTE — PHYSICAL EXAM NECK/THYROID:
normal appearance , no deformities 3 yo with cystic hygroma here on isrolimus here for left sided neck swelling. US negative for abscess but possible cellulitis. Spoke with heme will dc on clindamycin and hold sirolimus overnight.   - QUIRINO, PGY-2 3 yo with cystic hygroma here on isrolimus here for left sided neck swelling. US negative for abscess but possible cellulitis. Spoke with heme will dc on clindamycin and hold sirolimus overnight.   - , PGY-2    Betsy Lees MD - Attending Physician: Pt here with fever in setting of immunosuppression due to Sirolimus for Cystic Hygroma. Mild cough, noted increased L neck swelling from baseline, but no overlying erythema/tenderness/fluctuance. Labs, US neck, cultures, empiric CTX, d/w Heme

## 2024-05-06 ENCOUNTER — ERX REFILL RESPONSE (OUTPATIENT)
Dept: URBAN - METROPOLITAN AREA CLINIC 92 | Facility: CLINIC | Age: 84
End: 2024-05-06

## 2024-05-06 RX ORDER — OMEPRAZOLE 40 MG/1
TAKE ONE CAPSULE BY MOUTH TWICE A DAY 30 MINUTES BEFORE MORNING AND EVENING MEAL CAPSULE, DELAYED RELEASE ORAL
Qty: 180 CAPSULE | Refills: 1 | OUTPATIENT

## 2024-05-06 RX ORDER — OMEPRAZOLE 40 MG/1
1 CAPSULE 30 MINUTES BEFORE MORNING AND EVENING MEAL CAPSULE, DELAYED RELEASE ORAL TWICE A DAY
Qty: 180 | Refills: 1 | OUTPATIENT

## 2024-06-19 NOTE — HPI-TODAY'S VISIT:
Addended by: HENRIETTA CHOWDHURY on: 6/19/2024 01:02 PM     Modules accepted: Orders     October 2020 Ms. Gomez is a 80-year old female here for follow-up of a barium swallow performed August 2020 for dysphagia.  The examination revealed revealed esophageal dysmotility but was negative for an esophageal stricture, hiatal hernia, and acid reflux.  The patient admits to intermittent dysphagia.  An upper endoscopy 2019 with biopsy was unremarkable.  She takes omeprazole 40 mg a few days a week which helps her symptoms.  She stopped taking it daily due to the fear of dementia as a potential side effect.  April 8, 2021 The patient reports having a sick feeling in the stomach most mornings.  She takes Famotidine each night.  She admits to nausea but denies vomiting.  She has lost 6 pounds since October 2020.  She reports stress related to taking care of her ill  for the past 5 years.

## 2024-07-10 ENCOUNTER — LAB OUTSIDE AN ENCOUNTER (OUTPATIENT)
Dept: URBAN - METROPOLITAN AREA CLINIC 92 | Facility: CLINIC | Age: 84
End: 2024-07-10

## 2024-07-10 ENCOUNTER — OFFICE VISIT (OUTPATIENT)
Dept: URBAN - METROPOLITAN AREA CLINIC 92 | Facility: CLINIC | Age: 84
End: 2024-07-10
Payer: MEDICARE

## 2024-07-10 VITALS
BODY MASS INDEX: 29.06 KG/M2 | HEIGHT: 63 IN | WEIGHT: 164 LBS | TEMPERATURE: 97.3 F | HEART RATE: 69 BPM | DIASTOLIC BLOOD PRESSURE: 68 MMHG | SYSTOLIC BLOOD PRESSURE: 112 MMHG

## 2024-07-10 DIAGNOSIS — K59.01 CONSTIPATION BY DELAYED COLONIC TRANSIT: ICD-10-CM

## 2024-07-10 DIAGNOSIS — R19.7 DIARRHEA, UNSPECIFIED TYPE: ICD-10-CM

## 2024-07-10 DIAGNOSIS — K21.9 GERD: ICD-10-CM

## 2024-07-10 DIAGNOSIS — R13.19 CERVICAL DYSPHAGIA: ICD-10-CM

## 2024-07-10 PROCEDURE — 99214 OFFICE O/P EST MOD 30 MIN: CPT | Performed by: INTERNAL MEDICINE

## 2024-07-10 RX ORDER — METOPROLOL SUCCINATE 25 MG/1
TABLET, EXTENDED RELEASE ORAL
Qty: 0 | Refills: 0 | Status: ACTIVE | COMMUNITY
Start: 2017-07-11

## 2024-07-10 RX ORDER — FAMOTIDINE 40 MG/1
1 TABLET AT BEDTIME TABLET, FILM COATED ORAL ONCE A DAY
Qty: 30 TABLET | Refills: 6 | COMMUNITY
Start: 2020-10-19

## 2024-07-10 RX ORDER — LEVOTHYROXINE SODIUM 0.05 MG/1
TABLET ORAL
Qty: 0 | Refills: 0 | Status: ACTIVE | COMMUNITY
Start: 2017-05-31

## 2024-07-10 RX ORDER — POLYETHYLENE GLYCOL 3350, SODIUM SULFATE ANHYDROUS, SODIUM BICARBONATE, SODIUM CHLORIDE, POTASSIUM CHLORIDE 236; 22.74; 6.74; 5.86; 2.97 G/4L; G/4L; G/4L; G/4L; G/4L
AS DIRECTED POWDER, FOR SOLUTION ORAL
Qty: 1 BOTTLE | COMMUNITY
Start: 2022-03-03

## 2024-07-10 RX ORDER — TRAMADOL HYDROCHLORIDE 50 MG/1
1 TABLET AS NEEDED TABLET, FILM COATED ORAL ONCE A DAY
Status: ACTIVE | COMMUNITY

## 2024-07-10 RX ORDER — OMEPRAZOLE 40 MG/1
TAKE ONE CAPSULE BY MOUTH TWICE A DAY 30 MINUTES BEFORE MORNING AND EVENING MEAL CAPSULE, DELAYED RELEASE ORAL
Qty: 180 CAPSULE | Refills: 1 | Status: ACTIVE | COMMUNITY

## 2024-07-10 NOTE — HPI-TODAY'S VISIT:
81-year-old female hypothyroidism, GERD, who presents to discuss nausea and GERD.  For the past month, she has felt nauseated in the morning and notes the sensation of acid in her throat.  Feels like she can cough it up in the morning and also notes a sore throat at that time.  Occasionally notes the sensation of food sitting in her esophagus, localizes to the SSN.  Last EGD 8/2019 for dysphagia, performed empiric dilation to 50 Amharic.  She has been on omeprazole in the past but believes that she stopped it about a month ago.  Notes that when she was taking it, it seemed to control the symptoms above.  She was worried that it caused dementia and so she stopped it. Barium swallow from September 2021 reviewed, notable for mild dysmotility.   ***2/18/22: Since I last saw her, she started omeprazole 40 mg daily, which provided some relief.  However she is still having regurgitation and dysphagia, localizing to the neck and substernal notch.  Tastes bitter and sour when it comes back up.  She recently was found to have an irregular heartbeat, and is set to undergo a stress test next week and then follow-up again with cardiology.   ***5/4/22: EGD and colonoscopy unremarkable, esophageal and gastric biopsies unremarkable.  Empiric dilation performed.  Patient is doing much better, thinks that the dilation helped as well as the omeprazole 40 mg twice daily.  Dysphagia improved significantly.  Weight is stable now.  Still has not much of an appetite but her weight is not going down.  Has episodes of diaphoresis and nausea.  Noted left rib pain at times.  ***7/12/22: a few days ago had an episode of vomiting. having epigastric pain for the past one month, mostly in the AM. also feels constipated, sounds chronic. miralax helps when she remembers to take it. can go a week without a bm.  ***2/20/23: Having some constipation with daily Miralax use. Had some epigastric pain, nausea, but that has subsided. Feels gassy and bloated at times. On PPI daily.  ***7/2024: Noting some dysphagia with water, no issues with pills or solids, localizes to SSN. She remains on omeprazole 40mg daily. Rare HB. Tongue gets sore. Notes some gas and watery loose stools, also some anal burning when the stool comes out - for the past 2 weeks. No blood in the stool. BMs are BID and small. Also some bloating.

## 2024-07-22 LAB
CAMPYLOBACTER SPP. AG,EIA: (no result)
CLOSTRIDIUM DIFFICILE TOXINB,QL REAL TIME PCR: NOT DETECTED
SALMONELLA AND SHIGELLA, CULTURE: (no result)
SHIGA TOXINS, EIA W/RFL TO E.COLI O157 CULTURE: (no result)

## 2024-07-26 ENCOUNTER — CLAIMS CREATED FROM THE CLAIM WINDOW (OUTPATIENT)
Dept: URBAN - METROPOLITAN AREA SURGERY CENTER 16 | Facility: SURGERY CENTER | Age: 84
End: 2024-07-26

## 2024-07-26 RX ORDER — METOPROLOL SUCCINATE 25 MG/1
TABLET, EXTENDED RELEASE ORAL
Qty: 0 | Refills: 0 | Status: ACTIVE | COMMUNITY
Start: 2017-07-11

## 2024-07-26 RX ORDER — FAMOTIDINE 40 MG/1
1 TABLET AT BEDTIME TABLET, FILM COATED ORAL ONCE A DAY
Qty: 30 TABLET | Refills: 6 | COMMUNITY
Start: 2020-10-19

## 2024-07-26 RX ORDER — LEVOTHYROXINE SODIUM 0.05 MG/1
TABLET ORAL
Qty: 0 | Refills: 0 | Status: ACTIVE | COMMUNITY
Start: 2017-05-31

## 2024-07-26 RX ORDER — OMEPRAZOLE 40 MG/1
TAKE ONE CAPSULE BY MOUTH TWICE A DAY 30 MINUTES BEFORE MORNING AND EVENING MEAL CAPSULE, DELAYED RELEASE ORAL
Qty: 180 CAPSULE | Refills: 1 | Status: ACTIVE | COMMUNITY

## 2024-07-26 RX ORDER — TRAMADOL HYDROCHLORIDE 50 MG/1
1 TABLET AS NEEDED TABLET, FILM COATED ORAL ONCE A DAY
Status: ACTIVE | COMMUNITY

## 2024-07-26 RX ORDER — POLYETHYLENE GLYCOL 3350, SODIUM SULFATE ANHYDROUS, SODIUM BICARBONATE, SODIUM CHLORIDE, POTASSIUM CHLORIDE 236; 22.74; 6.74; 5.86; 2.97 G/4L; G/4L; G/4L; G/4L; G/4L
AS DIRECTED POWDER, FOR SOLUTION ORAL
Qty: 1 BOTTLE | COMMUNITY
Start: 2022-03-03

## 2024-08-05 ENCOUNTER — TELEPHONE ENCOUNTER (OUTPATIENT)
Dept: URBAN - METROPOLITAN AREA CLINIC 92 | Facility: CLINIC | Age: 84
End: 2024-08-05

## 2024-10-08 ENCOUNTER — OFFICE VISIT (OUTPATIENT)
Dept: URBAN - METROPOLITAN AREA CLINIC 92 | Facility: CLINIC | Age: 84
End: 2024-10-08
Payer: MEDICARE

## 2024-10-08 VITALS
BODY MASS INDEX: 29.06 KG/M2 | HEIGHT: 63 IN | HEART RATE: 70 BPM | TEMPERATURE: 97.2 F | DIASTOLIC BLOOD PRESSURE: 64 MMHG | WEIGHT: 164 LBS | SYSTOLIC BLOOD PRESSURE: 107 MMHG

## 2024-10-08 DIAGNOSIS — R13.10 DYSPHAGIA: ICD-10-CM

## 2024-10-08 DIAGNOSIS — R14.0 BLOATING: ICD-10-CM

## 2024-10-08 DIAGNOSIS — R19.7 DIARRHEA, UNSPECIFIED TYPE: ICD-10-CM

## 2024-10-08 DIAGNOSIS — K59.01 CONSTIPATION BY DELAYED COLONIC TRANSIT: ICD-10-CM

## 2024-10-08 DIAGNOSIS — K21.9 GERD: ICD-10-CM

## 2024-10-08 PROCEDURE — 99214 OFFICE O/P EST MOD 30 MIN: CPT | Performed by: INTERNAL MEDICINE

## 2024-10-08 RX ORDER — OMEPRAZOLE 40 MG/1
TAKE ONE CAPSULE BY MOUTH TWICE A DAY 30 MINUTES BEFORE MORNING AND EVENING MEAL CAPSULE, DELAYED RELEASE ORAL
Qty: 180 CAPSULE | Refills: 1 | Status: ACTIVE | COMMUNITY

## 2024-10-08 RX ORDER — METOPROLOL SUCCINATE 25 MG/1
TABLET, EXTENDED RELEASE ORAL
Qty: 0 | Refills: 0 | Status: ACTIVE | COMMUNITY
Start: 2017-07-11

## 2024-10-08 RX ORDER — POLYETHYLENE GLYCOL 3350, SODIUM SULFATE ANHYDROUS, SODIUM BICARBONATE, SODIUM CHLORIDE, POTASSIUM CHLORIDE 236; 22.74; 6.74; 5.86; 2.97 G/4L; G/4L; G/4L; G/4L; G/4L
AS DIRECTED POWDER, FOR SOLUTION ORAL
Qty: 1 BOTTLE | Status: ACTIVE | COMMUNITY
Start: 2022-03-03

## 2024-10-08 RX ORDER — TRAMADOL HYDROCHLORIDE 50 MG/1
1 TABLET AS NEEDED TABLET, FILM COATED ORAL ONCE A DAY
Status: ACTIVE | COMMUNITY

## 2024-10-08 RX ORDER — LEVOTHYROXINE SODIUM 0.05 MG/1
TABLET ORAL
Qty: 0 | Refills: 0 | Status: ACTIVE | COMMUNITY
Start: 2017-05-31

## 2024-10-08 RX ORDER — FAMOTIDINE 40 MG/1
1 TABLET AT BEDTIME TABLET, FILM COATED ORAL ONCE A DAY
Qty: 30 TABLET | Refills: 6 | Status: ACTIVE | COMMUNITY
Start: 2020-10-19

## 2024-10-08 NOTE — HPI-TODAY'S VISIT:
81-year-old female hypothyroidism, GERD, who presents to discuss nausea and GERD.  For the past month, she has felt nauseated in the morning and notes the sensation of acid in her throat.  Feels like she can cough it up in the morning and also notes a sore throat at that time.  Occasionally notes the sensation of food sitting in her esophagus, localizes to the SSN.  Last EGD 8/2019 for dysphagia, performed empiric dilation to 50 Khmer.  She has been on omeprazole in the past but believes that she stopped it about a month ago.  Notes that when she was taking it, it seemed to control the symptoms above.  She was worried that it caused dementia and so she stopped it. Barium swallow from September 2021 reviewed, notable for mild dysmotility.   ***2/18/22: Since I last saw her, she started omeprazole 40 mg daily, which provided some relief.  However she is still having regurgitation and dysphagia, localizing to the neck and substernal notch.  Tastes bitter and sour when it comes back up.  She recently was found to have an irregular heartbeat, and is set to undergo a stress test next week and then follow-up again with cardiology.   ***5/4/22: EGD and colonoscopy unremarkable, esophageal and gastric biopsies unremarkable.  Empiric dilation performed.  Patient is doing much better, thinks that the dilation helped as well as the omeprazole 40 mg twice daily.  Dysphagia improved significantly.  Weight is stable now.  Still has not much of an appetite but her weight is not going down.  Has episodes of diaphoresis and nausea.  Noted left rib pain at times.  ***7/12/22: a few days ago had an episode of vomiting. having epigastric pain for the past one month, mostly in the AM. also feels constipated, sounds chronic. miralax helps when she remembers to take it. can go a week without a bm.  ***2/20/23: Having some constipation with daily Miralax use. Had some epigastric pain, nausea, but that has subsided. Feels gassy and bloated at times. On PPI daily.  ***7/2024: Noting some dysphagia with water, no issues with pills or solids, localizes to SSN. She remains on omeprazole 40mg daily. Rare HB. Tongue gets sore. Notes some gas and watery loose stools, also some anal burning when the stool comes out - for the past 2 weeks. No blood in the stool. BMs are BID and small. Also some bloating.  ***10/2024: SP EGD with dilation to 57 Fr in 7/2024. Dysphagia improved s/p dilation, no issues currently. Taking PPI daily. Went to ER 9/18/24 with abd pain and diarrhea for 2 weeks. She was previously dx with C. diff but could not afford the antibiotics. SP vancomycin QID x 10 days. CT with pancolonic wall thickening. No further diarrhea or abdominal pain, but feels fatigued. Blood work was wnl aside from low K. States she had blood work since discharge and it was wnl. She had taken Augmentin prior to developing C. diff.

## 2024-12-20 ENCOUNTER — OFFICE VISIT (OUTPATIENT)
Dept: URBAN - METROPOLITAN AREA CLINIC 92 | Facility: CLINIC | Age: 84
End: 2024-12-20
Payer: MEDICARE

## 2024-12-20 VITALS
HEIGHT: 63 IN | HEART RATE: 67 BPM | DIASTOLIC BLOOD PRESSURE: 71 MMHG | TEMPERATURE: 97 F | SYSTOLIC BLOOD PRESSURE: 124 MMHG | BODY MASS INDEX: 28.7 KG/M2 | WEIGHT: 162 LBS

## 2024-12-20 DIAGNOSIS — R19.7 DIARRHEA, UNSPECIFIED TYPE: ICD-10-CM

## 2024-12-20 DIAGNOSIS — R13.10 DYSPHAGIA: ICD-10-CM

## 2024-12-20 DIAGNOSIS — K21.9 GERD: ICD-10-CM

## 2024-12-20 DIAGNOSIS — R14.0 BLOATING: ICD-10-CM

## 2024-12-20 PROCEDURE — 99213 OFFICE O/P EST LOW 20 MIN: CPT

## 2024-12-20 RX ORDER — POLYETHYLENE GLYCOL 3350, SODIUM SULFATE ANHYDROUS, SODIUM BICARBONATE, SODIUM CHLORIDE, POTASSIUM CHLORIDE 236; 22.74; 6.74; 5.86; 2.97 G/4L; G/4L; G/4L; G/4L; G/4L
AS DIRECTED POWDER, FOR SOLUTION ORAL
Qty: 1 BOTTLE | Status: ON HOLD | COMMUNITY
Start: 2022-03-03

## 2024-12-20 RX ORDER — METOPROLOL SUCCINATE 25 MG/1
TABLET, EXTENDED RELEASE ORAL
Qty: 0 | Refills: 0 | Status: ACTIVE | COMMUNITY
Start: 2017-07-11

## 2024-12-20 RX ORDER — OMEPRAZOLE 40 MG/1
TAKE ONE CAPSULE BY MOUTH TWICE A DAY 30 MINUTES BEFORE MORNING AND EVENING MEAL CAPSULE, DELAYED RELEASE ORAL
Qty: 180 CAPSULE | Refills: 1 | Status: ACTIVE | COMMUNITY

## 2024-12-20 RX ORDER — FAMOTIDINE 40 MG/1
1 TABLET AT BEDTIME TABLET, FILM COATED ORAL ONCE A DAY
Qty: 30 TABLET | Refills: 6 | Status: ON HOLD | COMMUNITY
Start: 2020-10-19

## 2024-12-20 RX ORDER — TRAMADOL HYDROCHLORIDE 50 MG/1
1 TABLET AS NEEDED TABLET, FILM COATED ORAL ONCE A DAY
Status: ACTIVE | COMMUNITY

## 2024-12-20 RX ORDER — LEVOTHYROXINE SODIUM 0.05 MG/1
TABLET ORAL
Qty: 0 | Refills: 0 | Status: ACTIVE | COMMUNITY
Start: 2017-05-31

## 2024-12-20 NOTE — HPI-TODAY'S VISIT:
81-year-old female hypothyroidism, GERD, who presents to discuss nausea and GERD.  For the past month, she has felt nauseated in the morning and notes the sensation of acid in her throat.  Feels like she can cough it up in the morning and also notes a sore throat at that time.  Occasionally notes the sensation of food sitting in her esophagus, localizes to the SSN.  Last EGD 8/2019 for dysphagia, performed empiric dilation to 50 French.  She has been on omeprazole in the past but believes that she stopped it about a month ago.  Notes that when she was taking it, it seemed to control the symptoms above.  She was worried that it caused dementia and so she stopped it. Barium swallow from September 2021 reviewed, notable for mild dysmotility.   ***2/18/22: Since I last saw her, she started omeprazole 40 mg daily, which provided some relief.  However she is still having regurgitation and dysphagia, localizing to the neck and substernal notch.  Tastes bitter and sour when it comes back up.  She recently was found to have an irregular heartbeat, and is set to undergo a stress test next week and then follow-up again with cardiology.   ***5/4/22: EGD and colonoscopy unremarkable, esophageal and gastric biopsies unremarkable.  Empiric dilation performed.  Patient is doing much better, thinks that the dilation helped as well as the omeprazole 40 mg twice daily.  Dysphagia improved significantly.  Weight is stable now.  Still has not much of an appetite but her weight is not going down.  Has episodes of diaphoresis and nausea.  Noted left rib pain at times.  ***7/12/22: a few days ago had an episode of vomiting. having epigastric pain for the past one month, mostly in the AM. also feels constipated, sounds chronic. miralax helps when she remembers to take it. can go a week without a bm.  ***2/20/23: Having some constipation with daily Miralax use. Had some epigastric pain, nausea, but that has subsided. Feels gassy and bloated at times. On PPI daily.  ***7/2024: Noting some dysphagia with water, no issues with pills or solids, localizes to SSN. She remains on omeprazole 40mg daily. Rare HB. Tongue gets sore. Notes some gas and watery loose stools, also some anal burning when the stool comes out - for the past 2 weeks. No blood in the stool. BMs are BID and small. Also some bloating.  ***10/2024: SP EGD with dilation to 57 Fr in 7/2024. Dysphagia improved s/p dilation, no issues currently. Taking PPI daily. Went to ER 9/18/24 with abd pain and diarrhea for 2 weeks. She was previously dx with C. diff but could not afford the antibiotics. SP vancomycin QID x 10 days. CT with pancolonic wall thickening. No further diarrhea or abdominal pain, but feels fatigued. Blood work was wnl aside from low K. States she had blood work since discharge and it was wnl. She had taken Augmentin prior to developing C. diff.  *** 12/2024: Was having diarrhea all day and night when she made the appt on monday. Now not as bad. Since october her BM have always been loose. Has not taken an abx recently. Has been having 3-4 x a day all loose. + smell foul. No BRBPR or melena. Has some cramping not as bad as it was. No nvfc. Takes PPI 2-3x a week as needed.

## 2024-12-29 LAB
CLOSTRIDIUM DIFFICILE TOXINB,QL REAL TIME PCR: DETECTED
CLOSTRIDIUM DIFFICILE: (no result)
SALMONELLA AND SHIGELLA, CULTURE: (no result)
SHIGA TOXINS, EIA W/RFL TO E.COLI O157 CULTURE: (no result)

## 2024-12-30 ENCOUNTER — TELEPHONE ENCOUNTER (OUTPATIENT)
Dept: URBAN - METROPOLITAN AREA CLINIC 92 | Facility: CLINIC | Age: 84
End: 2024-12-30

## 2024-12-31 ENCOUNTER — TELEPHONE ENCOUNTER (OUTPATIENT)
Dept: URBAN - METROPOLITAN AREA CLINIC 92 | Facility: CLINIC | Age: 84
End: 2024-12-31

## 2024-12-31 RX ORDER — VANCOMYCIN HYDROCHLORIDE 125 MG/1
1 CAPSULE CAPSULE ORAL
Qty: 40 CAPSULE | Refills: 0 | OUTPATIENT
Start: 2024-12-31 | End: 2025-01-10

## 2025-01-24 ENCOUNTER — OFFICE VISIT (OUTPATIENT)
Dept: URBAN - METROPOLITAN AREA CLINIC 92 | Facility: CLINIC | Age: 85
End: 2025-01-24

## 2025-01-24 RX ORDER — LEVOTHYROXINE SODIUM 0.05 MG/1
TABLET ORAL
Qty: 0 | Refills: 0 | Status: ACTIVE | COMMUNITY
Start: 2017-05-31

## 2025-01-24 RX ORDER — OMEPRAZOLE 40 MG/1
TAKE ONE CAPSULE BY MOUTH TWICE A DAY 30 MINUTES BEFORE MORNING AND EVENING MEAL CAPSULE, DELAYED RELEASE ORAL
Qty: 180 CAPSULE | Refills: 1 | Status: ACTIVE | COMMUNITY

## 2025-01-24 RX ORDER — METOPROLOL SUCCINATE 25 MG/1
TABLET, EXTENDED RELEASE ORAL
Qty: 0 | Refills: 0 | Status: ACTIVE | COMMUNITY
Start: 2017-07-11

## 2025-01-24 RX ORDER — POLYETHYLENE GLYCOL 3350, SODIUM SULFATE ANHYDROUS, SODIUM BICARBONATE, SODIUM CHLORIDE, POTASSIUM CHLORIDE 236; 22.74; 6.74; 5.86; 2.97 G/4L; G/4L; G/4L; G/4L; G/4L
AS DIRECTED POWDER, FOR SOLUTION ORAL
Qty: 1 BOTTLE | Status: ON HOLD | COMMUNITY
Start: 2022-03-03

## 2025-01-24 RX ORDER — FAMOTIDINE 40 MG/1
1 TABLET AT BEDTIME TABLET, FILM COATED ORAL ONCE A DAY
Qty: 30 TABLET | Refills: 6 | Status: ON HOLD | COMMUNITY
Start: 2020-10-19

## 2025-01-24 RX ORDER — TRAMADOL HYDROCHLORIDE 50 MG/1
1 TABLET AS NEEDED TABLET, FILM COATED ORAL ONCE A DAY
Status: ACTIVE | COMMUNITY

## 2025-01-31 ENCOUNTER — OFFICE VISIT (OUTPATIENT)
Dept: URBAN - METROPOLITAN AREA CLINIC 92 | Facility: CLINIC | Age: 85
End: 2025-01-31
Payer: MEDICARE

## 2025-01-31 VITALS
DIASTOLIC BLOOD PRESSURE: 80 MMHG | HEIGHT: 63 IN | WEIGHT: 163 LBS | SYSTOLIC BLOOD PRESSURE: 138 MMHG | HEART RATE: 76 BPM | BODY MASS INDEX: 28.88 KG/M2 | TEMPERATURE: 98.2 F

## 2025-01-31 DIAGNOSIS — K21.9 GERD: ICD-10-CM

## 2025-01-31 DIAGNOSIS — R13.10 DYSPHAGIA: ICD-10-CM

## 2025-01-31 DIAGNOSIS — K64.8 INTERNAL HEMORRHOIDS: ICD-10-CM

## 2025-01-31 DIAGNOSIS — A04.72 C. DIFFICILE DIARRHEA: ICD-10-CM

## 2025-01-31 DIAGNOSIS — R14.0 BLOATING: ICD-10-CM

## 2025-01-31 PROCEDURE — 99213 OFFICE O/P EST LOW 20 MIN: CPT

## 2025-01-31 RX ORDER — TRAMADOL HYDROCHLORIDE 50 MG/1
1 TABLET AS NEEDED TABLET, FILM COATED ORAL ONCE A DAY
Status: ACTIVE | COMMUNITY

## 2025-01-31 RX ORDER — OMEPRAZOLE 40 MG/1
TAKE ONE CAPSULE BY MOUTH TWICE A DAY 30 MINUTES BEFORE MORNING AND EVENING MEAL CAPSULE, DELAYED RELEASE ORAL
Qty: 180 CAPSULE | Refills: 1 | Status: ACTIVE | COMMUNITY

## 2025-01-31 RX ORDER — LEVOTHYROXINE SODIUM 0.05 MG/1
TABLET ORAL
Qty: 0 | Refills: 0 | Status: ACTIVE | COMMUNITY
Start: 2017-05-31

## 2025-01-31 RX ORDER — HYDROCORTISONE ACETATE 25 MG/1
1 SUPPOSITORY SUPPOSITORY RECTAL ONCE A DAY
Qty: 14 | Refills: 3 | OUTPATIENT
Start: 2025-01-31 | End: 2025-03-28

## 2025-01-31 RX ORDER — FAMOTIDINE 40 MG/1
1 TABLET AT BEDTIME TABLET, FILM COATED ORAL ONCE A DAY
Qty: 30 TABLET | Refills: 6 | Status: ON HOLD | COMMUNITY
Start: 2020-10-19

## 2025-01-31 RX ORDER — POLYETHYLENE GLYCOL 3350, SODIUM SULFATE ANHYDROUS, SODIUM BICARBONATE, SODIUM CHLORIDE, POTASSIUM CHLORIDE 236; 22.74; 6.74; 5.86; 2.97 G/4L; G/4L; G/4L; G/4L; G/4L
AS DIRECTED POWDER, FOR SOLUTION ORAL
Qty: 1 BOTTLE | Status: ON HOLD | COMMUNITY
Start: 2022-03-03

## 2025-01-31 RX ORDER — METOPROLOL SUCCINATE 25 MG/1
TABLET, EXTENDED RELEASE ORAL
Qty: 0 | Refills: 0 | Status: ACTIVE | COMMUNITY
Start: 2017-07-11

## 2025-01-31 NOTE — HPI-TODAY'S VISIT:
84-year-old female hypothyroidism, GERD, who presents to discuss nausea and GERD.  For the past month, she has felt nauseated in the morning and notes the sensation of acid in her throat.  Feels like she can cough it up in the morning and also notes a sore throat at that time.  Occasionally notes the sensation of food sitting in her esophagus, localizes to the SSN.  Last EGD 8/2019 for dysphagia, performed empiric dilation to 50 French.  She has been on omeprazole in the past but believes that she stopped it about a month ago.  Notes that when she was taking it, it seemed to control the symptoms above.  She was worried that it caused dementia and so she stopped it. Barium swallow from September 2021 reviewed, notable for mild dysmotility.   ***2/18/22: Since I last saw her, she started omeprazole 40 mg daily, which provided some relief.  However she is still having regurgitation and dysphagia, localizing to the neck and substernal notch.  Tastes bitter and sour when it comes back up.  She recently was found to have an irregular heartbeat, and is set to undergo a stress test next week and then follow-up again with cardiology.   ***5/4/22: EGD and colonoscopy unremarkable, esophageal and gastric biopsies unremarkable.  Empiric dilation performed.  Patient is doing much better, thinks that the dilation helped as well as the omeprazole 40 mg twice daily.  Dysphagia improved significantly.  Weight is stable now.  Still has not much of an appetite but her weight is not going down.  Has episodes of diaphoresis and nausea.  Noted left rib pain at times.  ***7/12/22: a few days ago had an episode of vomiting. having epigastric pain for the past one month, mostly in the AM. also feels constipated, sounds chronic. miralax helps when she remembers to take it. can go a week without a bm.  ***2/20/23: Having some constipation with daily Miralax use. Had some epigastric pain, nausea, but that has subsided. Feels gassy and bloated at times. On PPI daily.  ***7/2024: Noting some dysphagia with water, no issues with pills or solids, localizes to SSN. She remains on omeprazole 40mg daily. Rare HB. Tongue gets sore. Notes some gas and watery loose stools, also some anal burning when the stool comes out - for the past 2 weeks. No blood in the stool. BMs are BID and small. Also some bloating.  ***10/2024: SP EGD with dilation to 57 Fr in 7/2024. Dysphagia improved s/p dilation, no issues currently. Taking PPI daily. Went to ER 9/18/24 with abd pain and diarrhea for 2 weeks. She was previously dx with C. diff but could not afford the antibiotics. SP vancomycin QID x 10 days. CT with pancolonic wall thickening. No further diarrhea or abdominal pain, but feels fatigued. Blood work was wnl aside from low K. States she had blood work since discharge and it was wnl. She had taken Augmentin prior to developing C. diff.  *** 12/2024: Was having diarrhea all day and night when she made the appt on monday. Now not as bad. Since october her BM have always been loose. Has not taken an abx recently. Has been having 3-4 x a day all loose. + smell foul. No BRBPR or melena. Has some cramping not as bad as it was. No nvfc. Takes PPI 2-3x a week as needed.  ****1/2025 After last visit patient had stool studies that demonstrated positive C. difficile she was sent in [facility] however this was too expensive so vancomycin was sent. She is doing better now more formed BM. Has been having rectal itching. No bleeding. IH noted on colonoscopy 2022.

## 2025-02-01 NOTE — PHYSICAL EXAM SKIN:
Please see message sent to patient.   no rashes , no suspicious lesions , no areas of discoloration

## 2025-02-03 ENCOUNTER — OFFICE VISIT (OUTPATIENT)
Dept: URBAN - METROPOLITAN AREA CLINIC 92 | Facility: CLINIC | Age: 85
End: 2025-02-03

## 2025-03-10 ENCOUNTER — OFFICE VISIT (OUTPATIENT)
Dept: URBAN - METROPOLITAN AREA CLINIC 92 | Facility: CLINIC | Age: 85
End: 2025-03-10
Payer: MEDICARE

## 2025-03-10 VITALS
HEIGHT: 63 IN | SYSTOLIC BLOOD PRESSURE: 127 MMHG | WEIGHT: 165.6 LBS | TEMPERATURE: 97.9 F | HEART RATE: 73 BPM | DIASTOLIC BLOOD PRESSURE: 71 MMHG | BODY MASS INDEX: 29.34 KG/M2

## 2025-03-10 DIAGNOSIS — A04.72 C. DIFFICILE DIARRHEA: ICD-10-CM

## 2025-03-10 DIAGNOSIS — K21.9 GERD: ICD-10-CM

## 2025-03-10 DIAGNOSIS — R13.10 DYSPHAGIA: ICD-10-CM

## 2025-03-10 DIAGNOSIS — R14.0 BLOATING: ICD-10-CM

## 2025-03-10 DIAGNOSIS — R05.8 PRODUCTIVE COUGH: ICD-10-CM

## 2025-03-10 DIAGNOSIS — K64.8 INTERNAL HEMORRHOIDS: ICD-10-CM

## 2025-03-10 PROCEDURE — 99213 OFFICE O/P EST LOW 20 MIN: CPT

## 2025-03-10 RX ORDER — TRAMADOL HYDROCHLORIDE 50 MG/1
1 TABLET AS NEEDED TABLET, FILM COATED ORAL ONCE A DAY
Status: ACTIVE | COMMUNITY

## 2025-03-10 RX ORDER — METOPROLOL SUCCINATE 25 MG/1
TABLET, EXTENDED RELEASE ORAL
Qty: 0 | Refills: 0 | Status: ACTIVE | COMMUNITY
Start: 2017-07-11

## 2025-03-10 RX ORDER — OMEPRAZOLE 40 MG/1
TAKE ONE CAPSULE BY MOUTH TWICE A DAY 30 MINUTES BEFORE MORNING AND EVENING MEAL CAPSULE, DELAYED RELEASE ORAL
Qty: 180 CAPSULE | Refills: 1 | Status: ACTIVE | COMMUNITY

## 2025-03-10 RX ORDER — FAMOTIDINE 40 MG/1
1 TABLET AT BEDTIME TABLET, FILM COATED ORAL ONCE A DAY
Qty: 30 TABLET | Refills: 6 | Status: DISCONTINUED | COMMUNITY
Start: 2020-10-19

## 2025-03-10 RX ORDER — POLYETHYLENE GLYCOL 3350, SODIUM SULFATE ANHYDROUS, SODIUM BICARBONATE, SODIUM CHLORIDE, POTASSIUM CHLORIDE 236; 22.74; 6.74; 5.86; 2.97 G/4L; G/4L; G/4L; G/4L; G/4L
AS DIRECTED POWDER, FOR SOLUTION ORAL
Qty: 1 BOTTLE | Status: DISCONTINUED | COMMUNITY
Start: 2022-03-03

## 2025-03-10 RX ORDER — HYDROCORTISONE ACETATE 25 MG/1
1 SUPPOSITORY SUPPOSITORY RECTAL ONCE A DAY
Qty: 14 | Refills: 3 | Status: DISCONTINUED | COMMUNITY
Start: 2025-01-31 | End: 2025-03-28

## 2025-03-10 RX ORDER — LEVOTHYROXINE SODIUM 0.05 MG/1
TABLET ORAL
Qty: 0 | Refills: 0 | Status: ACTIVE | COMMUNITY
Start: 2017-05-31

## 2025-03-10 NOTE — HPI-TODAY'S VISIT:
84-year-old female hypothyroidism, GERD, who presents to discuss nausea and GERD.  For the past month, she has felt nauseated in the morning and notes the sensation of acid in her throat.  Feels like she can cough it up in the morning and also notes a sore throat at that time.  Occasionally notes the sensation of food sitting in her esophagus, localizes to the SSN.  Last EGD 8/2019 for dysphagia, performed empiric dilation to 50 French.  She has been on omeprazole in the past but believes that she stopped it about a month ago.  Notes that when she was taking it, it seemed to control the symptoms above.  She was worried that it caused dementia and so she stopped it. Barium swallow from September 2021 reviewed, notable for mild dysmotility.   ***2/18/22: Since I last saw her, she started omeprazole 40 mg daily, which provided some relief.  However she is still having regurgitation and dysphagia, localizing to the neck and substernal notch.  Tastes bitter and sour when it comes back up.  She recently was found to have an irregular heartbeat, and is set to undergo a stress test next week and then follow-up again with cardiology.   ***5/4/22: EGD and colonoscopy unremarkable, esophageal and gastric biopsies unremarkable.  Empiric dilation performed.  Patient is doing much better, thinks that the dilation helped as well as the omeprazole 40 mg twice daily.  Dysphagia improved significantly.  Weight is stable now.  Still has not much of an appetite but her weight is not going down.  Has episodes of diaphoresis and nausea.  Noted left rib pain at times.  ***7/12/22: a few days ago had an episode of vomiting. having epigastric pain for the past one month, mostly in the AM. also feels constipated, sounds chronic. miralax helps when she remembers to take it. can go a week without a bm.  ***2/20/23: Having some constipation with daily Miralax use. Had some epigastric pain, nausea, but that has subsided. Feels gassy and bloated at times. On PPI daily.  ***7/2024: Noting some dysphagia with water, no issues with pills or solids, localizes to SSN. She remains on omeprazole 40mg daily. Rare HB. Tongue gets sore. Notes some gas and watery loose stools, also some anal burning when the stool comes out - for the past 2 weeks. No blood in the stool. BMs are BID and small. Also some bloating.  ***10/2024: SP EGD with dilation to 57 Fr in 7/2024. Dysphagia improved s/p dilation, no issues currently. Taking PPI daily. Went to ER 9/18/24 with abd pain and diarrhea for 2 weeks. She was previously dx with C. diff but could not afford the antibiotics. SP vancomycin QID x 10 days. CT with pancolonic wall thickening. No further diarrhea or abdominal pain, but feels fatigued. Blood work was wnl aside from low K. States she had blood work since discharge and it was wnl. She had taken Augmentin prior to developing C. diff.  *** 12/2024: Was having diarrhea all day and night when she made the appt on monday. Now not as bad. Since october her BM have always been loose. Has not taken an abx recently. Has been having 3-4 x a day all loose. + smell foul. No BRBPR or melena. Has some cramping not as bad as it was. No nvfc. Takes PPI 2-3x a week as needed.  ****1/2025 After last visit patient had stool studies that demonstrated positive C. difficile she was sent in [facility] however this was too expensive so vancomycin was sent. She is doing better now more formed BM. Has been having rectal itching. No bleeding. IH noted on colonoscopy 2022.  ****3/2025 GI wise doing well. Did start probiotics which has helped her BM. No BRBPR or melena. She did use anusol prn for rectal itching which worked. No upper gi issues. She has been having a productive cough for the past week and now has some chest wall pain which started today. has not seen pcp.

## 2025-07-07 ENCOUNTER — OFFICE VISIT (OUTPATIENT)
Dept: URBAN - METROPOLITAN AREA CLINIC 92 | Facility: CLINIC | Age: 85
End: 2025-07-07

## 2025-07-07 RX ORDER — OMEPRAZOLE 40 MG/1
TAKE ONE CAPSULE BY MOUTH TWICE A DAY 30 MINUTES BEFORE MORNING AND EVENING MEAL CAPSULE, DELAYED RELEASE ORAL
Qty: 180 CAPSULE | Refills: 1 | Status: ACTIVE | COMMUNITY

## 2025-07-07 RX ORDER — LEVOTHYROXINE SODIUM 0.05 MG/1
TABLET ORAL
Qty: 0 | Refills: 0 | Status: ACTIVE | COMMUNITY
Start: 2017-05-31

## 2025-07-07 RX ORDER — TRAMADOL HYDROCHLORIDE 50 MG/1
1 TABLET AS NEEDED TABLET, FILM COATED ORAL ONCE A DAY
Status: ACTIVE | COMMUNITY

## 2025-07-07 RX ORDER — METOPROLOL SUCCINATE 25 MG/1
TABLET, EXTENDED RELEASE ORAL
Qty: 0 | Refills: 0 | Status: ACTIVE | COMMUNITY
Start: 2017-07-11